# Patient Record
Sex: MALE | Race: BLACK OR AFRICAN AMERICAN | NOT HISPANIC OR LATINO | Employment: OTHER | URBAN - METROPOLITAN AREA
[De-identification: names, ages, dates, MRNs, and addresses within clinical notes are randomized per-mention and may not be internally consistent; named-entity substitution may affect disease eponyms.]

---

## 2023-09-13 ENCOUNTER — HOSPITAL ENCOUNTER (EMERGENCY)
Facility: HOSPITAL | Age: 88
Discharge: HOME | End: 2023-09-13
Attending: EMERGENCY MEDICINE
Payer: COMMERCIAL

## 2023-09-13 ENCOUNTER — APPOINTMENT (EMERGENCY)
Dept: CT IMAGING | Facility: HOSPITAL | Age: 88
End: 2023-09-13
Payer: COMMERCIAL

## 2023-09-13 ENCOUNTER — APPOINTMENT (EMERGENCY)
Dept: RADIOLOGY | Facility: HOSPITAL | Age: 88
End: 2023-09-13
Payer: COMMERCIAL

## 2023-09-13 VITALS
OXYGEN SATURATION: 97 % | RESPIRATION RATE: 17 BRPM | HEART RATE: 63 BPM | DIASTOLIC BLOOD PRESSURE: 66 MMHG | SYSTOLIC BLOOD PRESSURE: 120 MMHG | TEMPERATURE: 97.5 F

## 2023-09-13 DIAGNOSIS — E86.0 DEHYDRATION: ICD-10-CM

## 2023-09-13 DIAGNOSIS — N17.9 AKI (ACUTE KIDNEY INJURY) (HCC): ICD-10-CM
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
## 2023-09-13 DIAGNOSIS — R55 NEAR SYNCOPE: Primary | ICD-10-CM

## 2023-09-13 LAB
ALBUMIN SERPL BCP-MCNC: 4.2 G/DL (ref 3.5–5)
ALP SERPL-CCNC: 46 U/L (ref 34–104)
ALT SERPL W P-5'-P-CCNC: 16 U/L (ref 7–52)
ANION GAP SERPL CALCULATED.3IONS-SCNC: 7 MMOL/L
APTT PPP: 21 SECONDS (ref 23–37)
AST SERPL W P-5'-P-CCNC: 22 U/L (ref 13–39)
ATRIAL RATE: 62 BPM
BASOPHILS # BLD AUTO: 0.02 THOUSANDS/ÂΜL (ref 0–0.1)
BASOPHILS NFR BLD AUTO: 0 % (ref 0–1)
BILIRUB SERPL-MCNC: 0.53 MG/DL (ref 0.2–1)
BNP SERPL-MCNC: 28 PG/ML (ref 0–100)
BUN SERPL-MCNC: 22 MG/DL (ref 5–25)
CALCIUM SERPL-MCNC: 10 MG/DL (ref 8.4–10.2)
CARDIAC TROPONIN I PNL SERPL HS: 5 NG/L
CHLORIDE SERPL-SCNC: 107 MMOL/L (ref 96–108)
CO2 SERPL-SCNC: 25 MMOL/L (ref 21–32)
CREAT SERPL-MCNC: 1.4 MG/DL (ref 0.6–1.3)
EOSINOPHIL # BLD AUTO: 0.02 THOUSAND/ÂΜL (ref 0–0.61)
EOSINOPHIL NFR BLD AUTO: 0 % (ref 0–6)
ERYTHROCYTE [DISTWIDTH] IN BLOOD BY AUTOMATED COUNT: 13.3 % (ref 11.6–15.1)
FLUAV RNA RESP QL NAA+PROBE: NEGATIVE
FLUBV RNA RESP QL NAA+PROBE: NEGATIVE
GFR SERPL CREATININE-BSD FRML MDRD: 44 ML/MIN/1.73SQ M
GLUCOSE SERPL-MCNC: 140 MG/DL (ref 65–140)
HCT VFR BLD AUTO: 38.1 % (ref 36.5–49.3)
HGB BLD-MCNC: 12.3 G/DL (ref 12–17)
IMM GRANULOCYTES # BLD AUTO: 0.01 THOUSAND/UL (ref 0–0.2)
IMM GRANULOCYTES NFR BLD AUTO: 0 % (ref 0–2)
INR PPP: 1.06 (ref 0.84–1.19)
LYMPHOCYTES # BLD AUTO: 1.02 THOUSANDS/ÂΜL (ref 0.6–4.47)
LYMPHOCYTES NFR BLD AUTO: 18 % (ref 14–44)
MCH RBC QN AUTO: 30.1 PG (ref 26.8–34.3)
MCHC RBC AUTO-ENTMCNC: 32.3 G/DL (ref 31.4–37.4)
MCV RBC AUTO: 93 FL (ref 82–98)
MONOCYTES # BLD AUTO: 0.41 THOUSAND/ÂΜL (ref 0.17–1.22)
MONOCYTES NFR BLD AUTO: 7 % (ref 4–12)
NEUTROPHILS # BLD AUTO: 4.13 THOUSANDS/ÂΜL (ref 1.85–7.62)
NEUTS SEG NFR BLD AUTO: 75 % (ref 43–75)
NRBC BLD AUTO-RTO: 0 /100 WBCS
P AXIS: 75 DEGREES
PLATELET # BLD AUTO: 221 THOUSANDS/UL (ref 149–390)
PMV BLD AUTO: 9.7 FL (ref 8.9–12.7)
POTASSIUM SERPL-SCNC: 4.5 MMOL/L (ref 3.5–5.3)
PR INTERVAL: 148 MS
PROT SERPL-MCNC: 7.5 G/DL (ref 6.4–8.4)
PROTHROMBIN TIME: 14.4 SECONDS (ref 11.6–14.5)
QRS AXIS: 19 DEGREES
QRSD INTERVAL: 88 MS
QT INTERVAL: 412 MS
QTC INTERVAL: 418 MS
RBC # BLD AUTO: 4.09 MILLION/UL (ref 3.88–5.62)
RSV RNA RESP QL NAA+PROBE: NEGATIVE
SARS-COV-2 RNA RESP QL NAA+PROBE: NEGATIVE
SODIUM SERPL-SCNC: 139 MMOL/L (ref 135–147)
T WAVE AXIS: 36 DEGREES
VENTRICULAR RATE: 62 BPM
WBC # BLD AUTO: 5.61 THOUSAND/UL (ref 4.31–10.16)

## 2023-09-13 PROCEDURE — 93005 ELECTROCARDIOGRAM TRACING: CPT

## 2023-09-13 PROCEDURE — 80053 COMPREHEN METABOLIC PANEL: CPT | Performed by: EMERGENCY MEDICINE

## 2023-09-13 PROCEDURE — G1004 CDSM NDSC: HCPCS

## 2023-09-13 PROCEDURE — 85610 PROTHROMBIN TIME: CPT | Performed by: EMERGENCY MEDICINE

## 2023-09-13 PROCEDURE — 0241U HB NFCT DS VIR RESP RNA 4 TRGT: CPT | Performed by: EMERGENCY MEDICINE

## 2023-09-13 PROCEDURE — 85025 COMPLETE CBC W/AUTO DIFF WBC: CPT | Performed by: EMERGENCY MEDICINE

## 2023-09-13 PROCEDURE — 36415 COLL VENOUS BLD VENIPUNCTURE: CPT | Performed by: EMERGENCY MEDICINE

## 2023-09-13 PROCEDURE — 71045 X-RAY EXAM CHEST 1 VIEW: CPT

## 2023-09-13 PROCEDURE — 70450 CT HEAD/BRAIN W/O DYE: CPT

## 2023-09-13 PROCEDURE — 83880 ASSAY OF NATRIURETIC PEPTIDE: CPT | Performed by: EMERGENCY MEDICINE

## 2023-09-13 PROCEDURE — 99285 EMERGENCY DEPT VISIT HI MDM: CPT | Performed by: EMERGENCY MEDICINE

## 2023-09-13 PROCEDURE — 93010 ELECTROCARDIOGRAM REPORT: CPT | Performed by: INTERNAL MEDICINE

## 2023-09-13 PROCEDURE — 99285 EMERGENCY DEPT VISIT HI MDM: CPT

## 2023-09-13 PROCEDURE — 96365 THER/PROPH/DIAG IV INF INIT: CPT

## 2023-09-13 PROCEDURE — 84484 ASSAY OF TROPONIN QUANT: CPT | Performed by: EMERGENCY MEDICINE

## 2023-09-13 PROCEDURE — 96366 THER/PROPH/DIAG IV INF ADDON: CPT

## 2023-09-13 PROCEDURE — 85730 THROMBOPLASTIN TIME PARTIAL: CPT | Performed by: EMERGENCY MEDICINE

## 2023-09-13 RX ADMIN — SODIUM CHLORIDE, SODIUM LACTATE, POTASSIUM CHLORIDE, AND CALCIUM CHLORIDE 1000 ML: .6; .31; .03; .02 INJECTION, SOLUTION INTRAVENOUS at 15:17

## 2023-09-13 NOTE — ED PROVIDER NOTES
Pt Name: Darren Lynn  MRN: [id number]  [address] 1934  Age/Sex: 80 y.o. male  Date of evaluation: 9/13/2023  PCP: No primary care provider on file. CHIEF COMPLAINT    Chief Complaint   Patient presents with   • Fall     Pt BIB EMS, pt fell in shower, hit his back on shower wall and slid down, denies LOC denies blood thinners     Trauma: Evaluation      Mechanism of Injury: Fall    LOC:  unknown  Airway:  intact  Breathing:  equal breath sounds bilaterally  Circulation: 2+ pulses x4  Disability: None  Exposure: As indicated        Numbers; 3-15 (gcs): 15      Alcohol Use: none      Pt Direct To CT: no           HPI    80 y.o. male presenting with fall. Patient was in the shower, fell, states he thinks he  hit the back of his head on the shower wall as he slid down. There was a shower mat in the shower and patient does not think he slipped but is not sure exactly why he fell. He does not think that he lost consciousness but states that it is possible he did briefly. Patient's son was right outside and came in immediately upon hearing him slide down, found him to be conscious and alert at that time. Patient denies any symptoms at this time. He does note a prior history of a similar event about a year ago at the gym. He notes that he is not been eating or drinking very much, his son expresses concern he may be dehydrated. HPI      Past Medical and Surgical History    History reviewed. No pertinent past medical history. History reviewed. No pertinent surgical history. History reviewed. No pertinent family history.     Social History     Tobacco Use   • Smoking status: Never   • Smokeless tobacco: Never   Vaping Use   • Vaping Use: Never used   Substance Use Topics   • Alcohol use: Not Currently   • Drug use: Never           Allergies    No Known Allergies    Home Medications    Prior to Admission medications    Not on File           Review of Systems    Review of Systems   Constitutional: Negative for appetite change, chills and diaphoresis. HENT: Negative for drooling, facial swelling, trouble swallowing and voice change. Respiratory: Negative for apnea, shortness of breath and wheezing. Cardiovascular: Negative for chest pain and leg swelling. Gastrointestinal: Negative for abdominal distention, abdominal pain, diarrhea, nausea and vomiting. Genitourinary: Negative for dysuria and urgency. Musculoskeletal: Negative for arthralgias, back pain, gait problem and neck pain. Skin: Negative for color change, rash and wound. Neurological: Negative for seizures, speech difficulty, weakness and headaches. Psychiatric/Behavioral: Negative for agitation, behavioral problems and dysphoric mood. The patient is not nervous/anxious. All other systems reviewed and negative. Physical Exam      ED Triage Vitals [09/13/23 1428]   Temperature Pulse Respirations Blood Pressure SpO2   97.5 °F (36.4 °C) 62 18 121/57 98 %      Temp Source Heart Rate Source Patient Position - Orthostatic VS BP Location FiO2 (%)   Oral Monitor Lying Right arm --      Pain Score       --               Physical Exam  Vitals and nursing note reviewed. Constitutional:       General: He is not in acute distress. Appearance: He is well-developed. He is not ill-appearing, toxic-appearing or diaphoretic. HENT:      Head: Normocephalic and atraumatic. Right Ear: External ear normal.      Left Ear: External ear normal.      Nose: Nose normal. No congestion or rhinorrhea. Mouth/Throat:      Mouth: Mucous membranes are dry. Pharynx: Oropharynx is clear. No oropharyngeal exudate or posterior oropharyngeal erythema. Eyes:      Conjunctiva/sclera: Conjunctivae normal.      Pupils: Pupils are equal, round, and reactive to light. Neck:      Trachea: No tracheal deviation. Cardiovascular:      Rate and Rhythm: Normal rate and regular rhythm. Pulses: Normal pulses.       Heart sounds: Normal heart sounds. No murmur heard. Pulmonary:      Effort: Pulmonary effort is normal. No respiratory distress. Breath sounds: Normal breath sounds. No stridor. No wheezing or rales. Abdominal:      General: There is no distension. Palpations: Abdomen is soft. Tenderness: There is no abdominal tenderness. There is no guarding or rebound. Musculoskeletal:         General: No deformity. Normal range of motion. Cervical back: Normal range of motion and neck supple. Right lower leg: No edema. Left lower leg: No edema. Skin:     General: Skin is warm and dry. Capillary Refill: Capillary refill takes less than 2 seconds. Findings: No rash. Neurological:      Mental Status: He is alert and oriented to person, place, and time. Cranial Nerves: No cranial nerve deficit. Sensory: No sensory deficit. Motor: No weakness. Coordination: Coordination normal.   Psychiatric:         Behavior: Behavior normal.         Thought Content: Thought content normal.         Judgment: Judgment normal.              Diagnostic Results  EKG Interpretation    Rate:  62  BPM  Rhythm:  Normal Sinus Rhythm   Axis:  Normal   Intervals: Normal, no blocks, QTc  418 ms  Q waves:  No pathologic Q waves   T waves:  Normal   ST segments:  No significant elevations or depressions     Impression:  Normal sinus rhythm without evidence of acute ischemia or significant arrhythmia      EKG for comparison: None available    EKG interpreted by me.      Labs:    Results Reviewed     Procedure Component Value Units Date/Time    HS Troponin 0hr (reflex protocol) [[id number]]  (Normal) Collected: 09/13/23 1519    Lab Status: Final result Specimen: Blood from Hand, Right Updated: 09/13/23 1624     hs TnI 0hr 5 ng/L     HS Troponin I 2hr [[id number]]     Lab Status: No result Specimen: Blood     HS Troponin I 4hr [[id number]]     Lab Status: No result Specimen: Blood     Protime-INR [[id number]]  (Normal) Collected: 09/13/23 1519    Lab Status: Final result Specimen: Blood from Arm, Right Updated: 09/13/23 1619     Protime 14.4 seconds      INR 1.06    APTT [[id number]]  (Abnormal) Collected: 09/13/23 1519    Lab Status: Final result Specimen: Blood from Arm, Right Updated: 09/13/23 1619     PTT 21 seconds     B-Type Natriuretic Peptide(BNP) [[id number]]  (Normal) Collected: 09/13/23 1519    Lab Status: Final result Specimen: Blood from Hand, Right Updated: 09/13/23 1618     BNP 28 pg/mL     Comprehensive metabolic panel [[id number]]  (Abnormal) Collected: 09/13/23 1519    Lab Status: Final result Specimen: Blood from Hand, Right Updated: 09/13/23 1557     Sodium 139 mmol/L      Potassium 4.5 mmol/L      Chloride 107 mmol/L      CO2 25 mmol/L      ANION GAP 7 mmol/L      BUN 22 mg/dL      Creatinine 1.40 mg/dL      Glucose 140 mg/dL      Calcium 10.0 mg/dL      AST 22 U/L      ALT 16 U/L      Alkaline Phosphatase 46 U/L      Total Protein 7.5 g/dL      Albumin 4.2 g/dL      Total Bilirubin 0.53 mg/dL      eGFR 44 ml/min/1.73sq m     Narrative:      [place] guidelines for Chronic Kidney Disease (CKD):   •  Stage 1 with normal or high GFR (GFR > 90 mL/min/1.73 square meters)  •  Stage 2 Mild CKD (GFR = 60-89 mL/min/1.73 square meters)  •  Stage 3A Moderate CKD (GFR = 45-59 mL/min/1.73 square meters)  •  Stage 3B Moderate CKD (GFR = 30-44 mL/min/1.73 square meters)  •  Stage 4 Severe CKD (GFR = 15-29 mL/min/1.73 square meters)  •  Stage 5 End Stage CKD (GFR <15 mL/min/1.73 square meters)  Note: GFR calculation is accurate only with a steady state creatinine    FLU/RSV/COVID - if FLU/RSV clinically relevant [[id number]]  (Normal) Collected: 09/13/23 1459    Lab Status: Final result Specimen: Nares from Nose Updated: 09/13/23 1553     SARS-CoV-2 Negative     INFLUENZA A PCR Negative     INFLUENZA B PCR Negative     RSV PCR Negative    Narrative:      FOR PEDIATRIC PATIENTS - copy/paste COVID Guidelines URL to browser: [email or web address]    SARS-CoV-2 assay is a Nucleic Acid Amplification assay intended for the  qualitative detection of nucleic acid from SARS-CoV-2 in nasopharyngeal  swabs. Results are for the presumptive identification of SARS-CoV-2 RNA. Positive results are indicative of infection with SARS-CoV-2, the virus  causing COVID-19, but do not rule out bacterial infection or co-infection  with other viruses. Laboratories within the [facility] and its  territories are required to report all positive results to the appropriate  public health authorities. Negative results do not preclude SARS-CoV-2  infection and should not be used as the sole basis for treatment or other  patient management decisions. Negative results must be combined with  clinical observations, patient history, and epidemiological information. This test has not been FDA cleared or approved. This test has been authorized by FDA under an Emergency Use Authorization  (EUA). This test is only authorized for the duration of time the  declaration that circumstances exist justifying the authorization of the  emergency use of an in vitro diagnostic tests for detection of SARS-CoV-2  virus and/or diagnosis of COVID-19 infection under section 564(b)(1) of  the Act, 21 U. S.C. 999UES-9(J)(6), unless the authorization is terminated  or revoked sooner. The test has been validated but independent review by FDA  and CLIA is pending. Test performed using [employer]: This RT-PCR assay targets N2,  a region unique to SARS-CoV-2. A conserved region in the E-gene was chosen  for pan-Sarbecovirus detection which includes SARS-CoV-2. According to CMS-2020-01-R, this platform meets the definition of high-throughput technology.     CBC and differential [[id number]] Collected: 09/13/23 1519    Lab Status: Final result Specimen: Blood from Hand, Right Updated: 09/13/23 1532 WBC 5.61 Thousand/uL      RBC 4.09 Million/uL      Hemoglobin 12.3 g/dL      Hematocrit 38.1 %      MCV 93 fL      MCH 30.1 pg      MCHC 32.3 g/dL      RDW 13.3 %      MPV 9.7 fL      Platelets 048 Thousands/uL      nRBC 0 /100 WBCs      Neutrophils Relative 75 %      Immat GRANS % 0 %      Lymphocytes Relative 18 %      Monocytes Relative 7 %      Eosinophils Relative 0 %      Basophils Relative 0 %      Neutrophils Absolute 4.13 Thousands/µL      Immature Grans Absolute 0.01 Thousand/uL      Lymphocytes Absolute 1.02 Thousands/µL      Monocytes Absolute 0.41 Thousand/µL      Eosinophils Absolute 0.02 Thousand/µL      Basophils Absolute 0.02 Thousands/µL           All labs reviewed and utilized in the medical decision making process    Radiology:    XR chest 1 view portable   Final Result      No acute cardiopulmonary disease. 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 Workstation performed: [id number]         CT head without contrast   Final Result      No acute intracranial abnormality. Workstation performed: [id number]             All radiology studies independently viewed by me and interpreted by the radiologist.    Procedure    Procedures        ED Course of Care and Re-Assessments      Patient had slightly low blood pressure at that normalized with resuscitation with IV fluids given for presumed dehydration. Reviewed electronic medical record, patient's creatinine seems to be at baseline of 1.3, BUN typically in the mid teens, current labs felt consistent with mild dehydration. Discussed differential diagnosis with patient as well as family to include postural  syncope potentiated by dehydration and possible vasodilation in the shower versus possible structural irregularity of the heart or malignant arrhythmia.   After extensive discussion of risk and benefits, patient declined offer of admission at this time and prefers to follow-up closely with his doctors in [place], provided with a cardiology referral.  We discussed the possibility patient may have another event and could be injured by a fall or possibly die due to malignant arrhythmia in the intervening time prior to making that follow-up, patient and family indicate understanding. Patient discharged at his request.    Medications   lactated ringers bolus 1,000 mL (0 mL Intravenous Stopped 9/13/23 1820)           FINAL IMPRESSION    Final diagnoses:   Fall, initial encounter   Near syncope   Dehydration   BRANDIE (acute kidney injury) [facility])         DISPOSITION/PLAN    Presentation as above with fall in the shower and possible near syncope or syncopal event. Vital signs reassuring, examination likewise reassuring with nonfocal neurologic exam.  CT scan showed no acute hemorrhage or other significant abnormality, labs remarkable for mild BRANDIE and evidence of dehydration but otherwise reassuring. Patient hydrated with IV fluids with improvement of blood pressure. As above, offered admission but patient and family ultimately declined in favor of close outpatient follow-up, discharged with strict return precautions. Time reflects when diagnosis was documented in both MDM as applicable and the Disposition within this note     Time User Action Codes Description Comment    9/13/2023  5:25 PM Leellen Sanes Add [Z91. WBOL] Fall, initial encounter     9/13/2023  5:25 PM Leellen Sanes Add [S09.90XA] Closed head injury, initial encounter     9/13/2023  5:26 PM Leellen Sanes Remove [S09.90XA] Closed head injury, initial encounter     9/13/2023  5:26 PM Leellen Sanes Add [R55] Near syncope     9/13/2023  5:27 PM Leellen Sanes Add [E86.0] Dehydration     9/13/2023  5:27 PM Ronnie Solid T Add [N17.9] BRANDIE (acute kidney injury) ([address])     9/13/2023  5:27 PM Ronnie Solid T Modify [K26. MESS] [facility], initial encounter     9/13/2023  5:27 PM Leellen Sanes Modify [R55] Near syncope 9/13/2023  5:27 PM Scott Dessert Modify [E86.0] Dehydration     9/13/2023  5:27 PM Scott Dessert Modify [N17.9] BRANDIE (acute kidney injury) [facility])       ED Disposition     ED Disposition   Discharge    Condition   Stable    Date/Time   Wed Sep 13, 2023  5:25 PM    Comment   Jd Faster discharge to home/self care. Follow-up Information     Follow up With Specialties Details Why Contact Info [facility] Emergency Department Emergency Medicine Go to  If symptoms worsen [address] [facility] [address] [facility] Emergency Department, [place], [place], [address]    Your primary care doctor  Call in 1 day To schedule close outpatient follow-up for this visit      Your cardiologist  Call in 1 day To discuss this visit and schedule close outpatient follow-up for further cardiac risk stratification              PATIENT REFERRED TO:    [facility] Emergency Department  [address] [address] [phone]  Go to   If symptoms worsen    Your primary care doctor    Call in 1 day  To schedule close outpatient follow-up for this visit    Your cardiologist    Call in 1 day  To discuss this visit and schedule close outpatient follow-up for further cardiac risk stratification      DISCHARGE MEDICATIONS:    There are no discharge medications for this patient. Scott Hodge MD    Portions of the record may have been created with voice recognition software. Occasional wrong word or "sound alike" substitutions may have occurred due to the inherent limitations of voice recognition software.   Please read the chart carefully and recognize, using context, where substitutions have occurred     Scott Hodge MD  09/13/23 1919